# Patient Record
Sex: FEMALE | Race: WHITE | Employment: FULL TIME | URBAN - METROPOLITAN AREA
[De-identification: names, ages, dates, MRNs, and addresses within clinical notes are randomized per-mention and may not be internally consistent; named-entity substitution may affect disease eponyms.]

---

## 2024-04-05 ENCOUNTER — OFFICE VISIT (OUTPATIENT)
Dept: SURGERY | Facility: CLINIC | Age: 44
End: 2024-04-05
Payer: COMMERCIAL

## 2024-04-05 VITALS
HEART RATE: 84 BPM | OXYGEN SATURATION: 97 % | HEIGHT: 63 IN | WEIGHT: 218 LBS | BODY MASS INDEX: 38.62 KG/M2 | DIASTOLIC BLOOD PRESSURE: 70 MMHG | SYSTOLIC BLOOD PRESSURE: 102 MMHG

## 2024-04-05 DIAGNOSIS — K21.9 GASTROESOPHAGEAL REFLUX DISEASE, UNSPECIFIED WHETHER ESOPHAGITIS PRESENT: ICD-10-CM

## 2024-04-05 DIAGNOSIS — E66.9 OBESITY (BMI 30-39.9): ICD-10-CM

## 2024-04-05 DIAGNOSIS — E78.5 DYSLIPIDEMIA: Primary | ICD-10-CM

## 2024-04-05 RX ORDER — ESOMEPRAZOLE MAGNESIUM 40 MG/1
40 CAPSULE, DELAYED RELEASE ORAL 2 TIMES DAILY
COMMUNITY

## 2024-04-05 NOTE — PROGRESS NOTES
[facility] Consultation Note       Date of Consult:  2024    Patient:  Penny Ambrocio  :      1980  MRN:      [id number]    Referring Provider: Self     Chief Complaint:    Chief Complaint   Patient presents with    Consult    Weight Management    Obesity       SUBJECTIVE     History of Present Illness:  Penny Ambrocio has been referred to me for evaluation and treatment.       44 yo female.  Presents to clinic for assistance with weight loss/maintenance.   Previous patient of Dr. Sorto in [place].  Reports she has taken wellbutrin, phentermine, topiramate, and metformin for weight loss in the past.   Developed itching with bupropion, can no longer tolerate the medication.   Has most recently been taking compounded Ozempic lost 13 lbs.  SW prior to working with Dr. Sorto: 220 lbs  LW: 170 lbs     Patient is not considering medications and currently desires bariatric surgery for weight loss.    Patient is employed: psychiatric NP.  Patient lives with dad.    Previous weight loss efforts: low carb diet, IF, medically supported weight loss, vegan dietary pattern  Patient's goal weight: 150 lbs  Heaviest weight ever: 230 lbs   Previous use of medical weight loss medications: as stated above     Physical activity: 6 days gym     Sleep: interrupted hours/night    Sleep screening: sleep study negative       Past Medical History:   Past Medical History:   Diagnosis Date    Anxiety state, unspecified     Hirsutism     Lipid screening     Other specified disease of hair and hair follicles     Personal history of urinary (tract) infection     Routine Papanicolaou smear     Skin lumps     Unspecified asthma(493.90)     Vaginitis and vulvovaginitis, unspecified        OBJECTIVE     Vitals: /70 (BP Location: Right arm, Patient Position: Sitting, Cuff Size: adult)   Pulse 84   Ht 5' 3\" (1.6 m)   Wt 218 lb (98.9 kg)   SpO2 97%   BMI 38.62 kg/m²        Wt Readings from Last 6 Encounters:    04/05/24 218 lb (98.9 kg)   05/31/13 203 lb (92.1 kg)   05/17/13 203 lb (92.1 kg)   04/26/13 203 lb (92.1 kg)   04/01/13 202 lb (91.6 kg)   11/26/12 200 lb (90.7 kg)        Patient Medications:    Current Outpatient Medications   Medication Sig Dispense Refill    Esomeprazole Magnesium 40 MG Oral Capsule Delayed Release Take 1 capsule (40 mg total) by mouth in the morning and 1 capsule (40 mg total) before bedtime.      Fluticasone Propionate (FLONASE) 50 MCG/ACT Nasal Suspension INSTILL 2 SPRAYS IN EACH NOSTRIL DAILY 1 Bottle 0    SUMAtriptan Succinate 100 MG Oral Tab TAKE 1 TABLET BY MOUTH AT ONSET OF HEADACHE AND MAY REPEAT IN 2 HOURS IF NEEDED; MAX OF 2 TABLETS IN 24 HOURS 9 Tab 0       Allergies:  Iodine (topical), Animal dander [dander], Dust mites, Levaquin, Macrobid [nitrofurantoin monohydrate macrocrystals], Mold, Pecan, Ragweed, Shellfish, Sulfa antibiotics, and Sulfa drugs cross reactors     Comorbidities:  Dyslipidemia, GERD     Social History:  Reviewed     Surgical History:    Past Surgical History:   Procedure Laterality Date    ORAL SURGERY PROCEDURE  2002    [place] [facility]    OTHER SURGICAL HISTORY  12/98    Urethra Surgery, Dilation       Family History:  History reviewed. No pertinent family history.    Typical Dietary Intake:  Breakfast AM Snack Lunch PM Snack Dinner   IF  IF IF Protein shake shop Meat  Low carb tortilla  Sweet potato chips     Tacos    Meal delivery Factor      After dinner behavior: ice cream, cookies   Night eating: -  Portion sizes: -  Binge: -  Emotional: +  Depression:  Grazing: +  Sweet tooth: +  Crunchy/salty:  Etoh: None     Soda Drinker: No  If yes, how much?:  seltzer water   Sports Drinks:  No    Juice:  No      Number of restaurant or fast food meals/week:  2 meals/week    Nutritional Goals Reviewed and Discussed:     Eat 3-4 cups of fresh fruit or vegetables daily    Behavior Modifications Reviewed and Discussed:    Eat breakfast, Eat 3 meals per day, Plan meals in  advance, Read nutrition labels, Drink 64oz of water per day, Maintain a daily food journal, Utilize portion control strategies to reduce calorie intake, Identify triggers for eating and manage cues, and Eat slowly and take 20 to 30 minutes to complete each meal    ROS:  Constitutional: positive for fatigue  Respiratory: negative  Cardiovascular: negative  Gastrointestinal: positive for reflux symptoms  Integument/breast: negative  Hematologic/lymphatic: negative  Musculoskeletal:positive for arthralgias and back pain  Neurological: positive for headaches  Behavioral/Psych: negative  Endocrine: negative    Physical Exam:  General appearance: alert, appears stated age, cooperative and obese  Head: Normocephalic, without obvious abnormality, atraumatic  Neck: no adenopathy, no carotid bruit, no JVD, supple, symmetrical, trachea midline and thyroid not enlarged, symmetric, no tenderness/mass/nodules  Lungs: clear to auscultation bilaterally  Heart: S1, S2 normal, no murmur, click, rub or gallop, regular rate and rhythm  Abdomen: soft, non-tender; bowel sounds normal; no masses,  no organomegaly and abdomen obese   Extremities: intact, no edema   Pulses: 2+ and symmetric  Skin: intact   Neurologic: Grossly normal      ASSESSMENT         Encounter Diagnosis(ses):   1. Dyslipidemia    2. Gastroesophageal reflux disease, unspecified whether esophagitis present    3. Obesity (BMI 30-39.9)        PLAN         Diagnoses and all orders for this visit:    Dyslipidemia  -     DIETITIAN EDUCATION INITIAL, DIET (INTERNAL)    Gastroesophageal reflux disease, unspecified whether esophagitis present  -     DIETITIAN EDUCATION INITIAL, DIET (INTERNAL)    Obesity (BMI 30-39.9)  -     DIETITIAN EDUCATION INITIAL, DIET (INTERNAL)      DYSLIPIDEMIA: Recommend dietary changes and lifestyle modifications as discussed below. Monitor.       Lab Results   Component Value Date/Time    CHOLEST 272 (H) 06/05/2013 12:19 PM     (H) 06/05/2013  12:19 PM    HDL 72 06/05/2013 12:19 PM    TRIG 168 (H) 06/05/2013 12:19 PM    TCHDLRATIO 3.8 06/05/2013 12:19 PM     GERD: Recommend dietary changes and lifestyle modifications as discussed below. Monitor.     OBESITY/WEIGHT GAIN:    Recommended intensive lifestyle and behavioral modifications at this time for weight loss.    Educated patient on lifestyle modifications: Whole Food/Plant Strong/Low Glycemic Index diet, moderate alcohol consumption, reduced sodium intake to no more than 2,400 mg/day, and at least 150 minutes of moderate physical activity per week.   Avoid processed, poor quality carbohydrates, refined grains, flour, sugar.    Goals for next month:  1. Keep a food log.  2. Drink 64 ounces of non-caloric beverages per day. No fruit juices or regular soda.  3. Aim for 150 minutes moderate exercise per week.    4. Increase fruit and vegetable servings to 5-6 per day.    5. Improve sleep and stress.     Reviewed labs: o/s labs October 2023. Patient to provide copy.    Discussed medication options for weight loss in detail with patient.     Patient reports extensive use of weight loss medication in the past including:  wellbutrin, phentermine, topiramate, and metformin   Developed itching with bupropion, can no longer tolerate the medication.   Has most recently been taking compounded Ozempic- 13 lbs weight loss.    The patient is interested in bariatric surgery and would like to begin our presurgical process.   Reviewed with patient the risks and benefits of laparoscopic Sleeve Gastrectomy vs RYGBP.     Attend seminar.   Meet with RD.   Meet with surgeon.     Patient informed she will need bariatric labs, psychological, cardiology, and pulmonary clearances, and possible GI consult prior to surgery.    I spent 45 minutes preparing chart, obtaining/reviewing pertinent history, performing medically appropriate examination/evaluations, counseling/educating on assessment and plan, ordering and reviewing  tests/medications as needed, referring/communicating with other health care professionals as needed, documenting clinical information, interpreting results, communicating results, and/or coordinating patient care.     RTC 2-3 months with Dr. Sorto after seminar.     KIM France

## 2024-04-05 NOTE — PATIENT INSTRUCTIONS
GASTRIC BYPASS  80% of patients reach at least 161 lbs 18 months after surgery.  50% of patients reach at least 146 lbs 18 months after surgery.  20% of patients reach 131 lbs 18 months after surgery.    SLEEVE GASTRECTOMY  80% of patients reach at least 172 lbs 18 months after surgery.  50% of patients reach at least 156 lbs 18 months after surgery.  20% of patients reach 140 lbs 18 months after surgery.    Attend bariatric seminar.  To schedule bariatric seminar:   Call [phone] or   Schedule Online at- [email or web address]    After you attend seminar, please reach out to the surgeon's office to have your insurance benefits verified.   The number to call is [phone] and the office can direct you from there.   The surgeon's office will not verify your benefits until you have attended seminar. Please do not call the surgeon until after seminar.     Magnesium glycinate 200 to 500 mg/day for sleep.   Life Extension. NOW. Garden of Life.     Or consider Natural Calm.   by Natural Vitality  Follow dosage instructions.  Start 1 teaspoon and increase up to 3 teaspoons as needed for sleep.    Aim for 78 grams protein/day.  25-30 grams/meal.   3 PM nuts/seeds.   Eat within 1-3 hours upon waking.   Meals between 7 or 9 AM and 7 PM.   6 days on, 1 day off.   Cronometer for tracking.  Add psyllium husk daily. [employer].   Build up to 35-40 grams of fiber/day.   Aim for 64 oz of water/day.    Aim for a total of:  2 fruits a day (avocado, tomato, citrus/oranges, apples, berries)  1/2 cup or medium size    4 non-starchy vegetables/day (1/2 cup cooked; 1 cup raw) (greens, peppers, onions, garlic, broccoli, cauliflower, brussels sprouts, asparagus, etc.)    0-2 starches/day (oatmeal, sweet potato, carrots, brown rice, etc).    3 protein per day (fish, seafood, meat, or plants: salmon, nuts, seeds, shrimp, chicken, turkey, beans, lentils, chickpeas, etc).    2 healthy fats (avocado, avocado oil, olives,  olive oil, salmon, nuts, seeds)    I recommend a whole food, plant powered diet with low glycemic index:     Aim for 3 meals a day and 1-2 snacks as needed.    Aim for a protein + produce at each meal time.     Breakfast ideas:  1. Fruit and nuts/seeds.  2. Eggs scrambled with vegetables.  3. Oatmeal (stovetop), cinnamon, 2 tbsp flaxseed, berries, nuts.  4. Protein shake + fruit. (1 scoop with water/ice). Garden of Life Fit, Nutiva, Hernandez, and Orgain are good brands.      Snacks:  Raw vegetables and hummus, apples and peanut butter, nuts, seeds, fruit, pecans drizzled with organic honey.      Use the Healthy Plate method for lunch and dinner:  1/2 right side of plate non-starchy vegetables.  Bottom left 1/4 plate protein.  Top left 1/4 starch as desired.      Aim for 150 minutes moderate level exercise weekly with 2-3 days strength training.    Add Magnesium glycinate 200 to 500 mg/day for sleep.   Life Extension. NOW. Garden of Life. Whole Food Brand. D[employer]. Pure Encapsulations.     Or consider Natural Calm.   by Natural Vitality  Follow dosage instructions.  Start 1 teaspoon and increase up to 3 teaspoons as needed for sleep.